# Patient Record
Sex: MALE | Race: WHITE | NOT HISPANIC OR LATINO | URBAN - METROPOLITAN AREA
[De-identification: names, ages, dates, MRNs, and addresses within clinical notes are randomized per-mention and may not be internally consistent; named-entity substitution may affect disease eponyms.]

---

## 2017-01-01 ENCOUNTER — OFFICE VISIT - HEALTHEAST (OUTPATIENT)
Dept: GERIATRICS | Facility: CLINIC | Age: 82
End: 2017-01-01

## 2017-01-01 ENCOUNTER — AMBULATORY - HEALTHEAST (OUTPATIENT)
Dept: GERIATRICS | Facility: CLINIC | Age: 82
End: 2017-01-01

## 2017-01-01 ENCOUNTER — AMBULATORY - HEALTHEAST (OUTPATIENT)
Dept: OTHER | Facility: CLINIC | Age: 82
End: 2017-01-01

## 2017-01-01 ENCOUNTER — HOSPITAL ENCOUNTER (OUTPATIENT)
Dept: RADIOLOGY | Facility: CLINIC | Age: 82
Discharge: HOME | End: 2017-11-28
Attending: NEUROLOGICAL SURGERY

## 2017-01-01 ENCOUNTER — COMMUNICATION - HEALTHEAST (OUTPATIENT)
Dept: NEUROSURGERY | Facility: CLINIC | Age: 82
End: 2017-01-01

## 2017-01-01 ENCOUNTER — OFFICE VISIT - HEALTHEAST (OUTPATIENT)
Dept: NEUROSURGERY | Facility: CLINIC | Age: 82
End: 2017-01-01

## 2017-01-01 ENCOUNTER — AMBULATORY - HEALTHEAST (OUTPATIENT)
Dept: ADMINISTRATIVE | Facility: CLINIC | Age: 82
End: 2017-01-01

## 2017-01-01 DIAGNOSIS — F03.918 DEMENTIA WITH BEHAVIORAL DISTURBANCE (H): ICD-10-CM

## 2017-01-01 DIAGNOSIS — G93.40 ENCEPHALOPATHY: ICD-10-CM

## 2017-01-01 DIAGNOSIS — R53.81 PHYSICAL DECONDITIONING: ICD-10-CM

## 2017-01-01 DIAGNOSIS — G47.00 INSOMNIA: ICD-10-CM

## 2017-01-01 DIAGNOSIS — S06.5XAA SUBDURAL HEMATOMA (H): ICD-10-CM

## 2017-01-01 DIAGNOSIS — Z86.79 S/P EVACUATION OF SUBDURAL HEMATOMA: ICD-10-CM

## 2017-01-01 DIAGNOSIS — R26.89 BALANCE DISORDER: ICD-10-CM

## 2017-01-01 DIAGNOSIS — Z98.890 S/P EVACUATION OF SUBDURAL HEMATOMA: ICD-10-CM

## 2017-01-01 DIAGNOSIS — F03.91 DEMENTIA WITH BEHAVIORAL DISTURBANCE, UNSPECIFIED DEMENTIA TYPE: ICD-10-CM

## 2017-01-01 DIAGNOSIS — S12.600A C7 CERVICAL FRACTURE (H): ICD-10-CM

## 2017-01-01 DIAGNOSIS — Z86.711 HISTORY OF PULMONARY EMBOLISM: ICD-10-CM

## 2017-01-01 DIAGNOSIS — S12.601D CLOSED NONDISPLACED FRACTURE OF SEVENTH CERVICAL VERTEBRA WITH ROUTINE HEALING, UNSPECIFIED FRACTURE MORPHOLOGY, SUBSEQUENT ENCOUNTER: ICD-10-CM

## 2017-01-01 DIAGNOSIS — L89.523: ICD-10-CM

## 2017-01-01 DIAGNOSIS — Z72.820 SLEEP DEPRIVATION: ICD-10-CM

## 2017-01-01 DIAGNOSIS — S06.5XAA SDH (SUBDURAL HEMATOMA) (H): ICD-10-CM

## 2017-01-01 DIAGNOSIS — R41.4 VISUAL NEGLECT: ICD-10-CM

## 2017-01-01 DIAGNOSIS — E78.5 HYPERLIPIDEMIA: ICD-10-CM

## 2017-01-01 DIAGNOSIS — R45.1 RESTLESS: ICD-10-CM

## 2017-01-01 DIAGNOSIS — R13.10 DYSPHAGIA: ICD-10-CM

## 2017-01-01 DIAGNOSIS — R45.851 SUICIDAL THOUGHTS: ICD-10-CM

## 2017-01-01 RX ORDER — POLYETHYLENE GLYCOL 3350 17 G/17G
17 POWDER, FOR SOLUTION ORAL DAILY PRN
Status: SHIPPED | COMMUNITY
Start: 2017-01-01

## 2017-01-01 RX ORDER — AMOXICILLIN 250 MG
2 CAPSULE ORAL 2 TIMES DAILY
Status: SHIPPED | COMMUNITY
Start: 2017-01-01

## 2017-01-01 RX ORDER — ATORVASTATIN CALCIUM 10 MG/1
10 TABLET, FILM COATED ORAL DAILY
Status: SHIPPED | COMMUNITY
Start: 2017-01-01

## 2017-01-01 RX ORDER — BISACODYL 10 MG
10 SUPPOSITORY, RECTAL RECTAL DAILY PRN
Status: SHIPPED | COMMUNITY
Start: 2017-01-01

## 2017-01-01 RX ORDER — ACETAMINOPHEN 325 MG/1
650 TABLET ORAL EVERY 4 HOURS PRN
Status: SHIPPED | COMMUNITY
Start: 2017-01-01

## 2017-12-07 ENCOUNTER — COMMUNICATION - HEALTHEAST (OUTPATIENT)
Dept: NEUROSURGERY | Facility: CLINIC | Age: 82
End: 2017-12-07

## 2021-05-31 VITALS — WEIGHT: 176 LBS | BODY MASS INDEX: 22 KG/M2

## 2021-06-12 NOTE — PROGRESS NOTES
"[facility]    Facility:   [facility] [[id number]]   Code Status: DNR      CHIEF COMPLAINT/REASON FOR VISIT:  Chief Complaint   Patient presents with     Review Of Multiple Medical Conditions       HISTORY:      HPI: Dale is a 85 y.o. male in follow-up of his multiple issues.  He has been getting sleep at night and this has made his behaviors better during the day.  However still during the day he is always wanting to get up and out of either the chair or the bed.  Staff has been taking turns being one-on-one with him.  He is now also gotten stronger.  He is a little irritable today.  When I asked him if his son had seen him lately he said \"of course she is my son what a stupid question\" he was not very well coming in my visit.  And asked me to walk away because I am apparently blocking his review of the doorway.      Past Medical History:   Diagnosis Date     DVT (deep venous thrombosis)      HLD (hyperlipidemia)      Left-sided weakness      Pulmonary embolism without acute cor pulmonale      SDH (subdural hematoma)              No family history on file.  Social History     Social History     Marital status:      Spouse name: N/A     Number of children: N/A     Years of education: N/A     Social History Main Topics     Smoking status: Not on file     Smokeless tobacco: Not on file     Alcohol use Not on file     Drug use: Not on file     Sexual activity: Not on file     Other Topics Concern     Not on file     Social History Narrative     No narrative on file         Review of Systems  Unable to obtain because of level of mentation.  Patient is alert, awake, oriented to, place and person, not in acute cardiorespiratory distress  Skin: Warm, and moist,  no lesions,   Head: atraumatic, normocephalic,   Eyes: EOM's intact and conjugate, pink palpebral conjunctivae, anicteric sclerae, pupils reactive  Lungs : Clear to auscultation , no crackles, wheezes or " rhonchi  Heart : Nornal first and second heart sounds, No murmurs, heaves, or thrills  Abdomen: Soft, non tender, non distended, no hepatosplenomegaly, no ascites  Extremities: No edema, pulses are full and equal  Ulcer in the back overall looks better but still present still not closing up.    .There were no vitals filed for this visit.    Physical Exam      LABS:   No results found for this or any previous visit (from the past 168 hour(s)).      ASSESSMENT:      ICD-10-CM    1. Restless R45.1    2. Encephalopathy G93.40    3. SDH (subdural hematoma) I62.00    4. History of pulmonary embolism Z86.711        PLAN:    At this point would add Seroquel 12.5 mg p.o. once a day as needed and only when he poses a risk of harm to himself or others.  Continue with Seroquel at night as this has definitely helped with his sleep.  CT scan is scheduled in follow-up with neurology.  Lovenox has been discontinued since he has been more active.      Total 25 minutes of which 55% was spent counseling and coordination of care of the above plan.    Electronically signed by: Marc Jimenez MD

## 2021-06-12 NOTE — PROGRESS NOTES
[facility]    Facility:   [facility] [[id number]]   Code Status: POLST AVAILABLE      CHIEF COMPLAINT/REASON FOR VISIT:  Chief Complaint   Patient presents with     Review Of Multiple Medical Conditions       HISTORY:      HPI: Dale is a 85 y.o. male seen today in follow-up of his multiple issues.  He was seen sitting in the lobby area.  Talking with the .  About the same as far as his ability to communicate effectively.  He answers questions but it does not always mean that he is making any sense.  His sleep is still very poor.  Sleeps minimally.  And always trying to get out of bed when in bed.  Has not had any falls over the weekend.  He continues to work with physical therapy.  Left-sided weakness and seems to lean over on the right side.      Past Medical History:   Diagnosis Date     DVT (deep venous thrombosis)      HLD (hyperlipidemia)      Left-sided weakness      Pulmonary embolism without acute cor pulmonale      SDH (subdural hematoma)              No family history on file.  Social History     Social History     Marital status:      Spouse name: N/A     Number of children: N/A     Years of education: N/A     Social History Main Topics     Smoking status: Not on file     Smokeless tobacco: Not on file     Alcohol use Not on file     Drug use: Not on file     Sexual activity: Not on file     Other Topics Concern     Not on file     Social History Narrative     No narrative on file         Review of Systems  Unreliable because of patient's level of dementia  .There were no vitals filed for this visit.    Physical Exam  Vital signs are noted.  Patient is alert, awake,, not in acute cardiorespiratory distress  Skin: Warm, and moist,  no lesions,   Head: Scalp laceration is healed completely, normocephalic,   Eyes: EOM's intact and conjugate, pink palpebral conjunctivae, anicteric sclerae, pupils reactive  Lungs : Clear to auscultation , no  crackles, wheezes or rhonchi  Heart : Nornal first and second heart sounds, No murmurs, heaves, or thrills  Abdomen: Soft, non tender, non distended, no hepatosplenomegaly, no ascites  Extremities: No edema, pulses are full and equal      LABS:   No results found for this or any previous visit (from the past 72 hour(s)).      ASSESSMENT:      ICD-10-CM    1. SDH (subdural hematoma) I62.00    2. Encephalopathy G93.40    3. History of pulmonary embolism Z86.711    4. Restless R45.1        PLAN:    Add Seroquel 25 mg p.o. q. at bedtime  Continue to redirect into oriented 19 day.  Continue with current therapies.  Follow-up with neurology proceeded by a CT scan.  Discontinue Lovenox as he is mobile.  Do not restart warfarin.  Total 25 minutes of which 55% was spent counseling and coordination of care of the above plan.    Electronically signed by: Marc Jimenez MD

## 2021-06-12 NOTE — PROGRESS NOTES
[facility]    Facility:   [facility] [[id number]]   Code Status: DNR      CHIEF COMPLAINT/REASON FOR VISIT:  Chief Complaint   Patient presents with     Review Of Multiple Medical Conditions       HISTORY:      HPI: Dale is a 85 y.o. male was seen today in follow-up of his multiple issues.  I saw him today reading the newspaper.  He was actually reading the newspaper intently and this is such an improvement in him.  Although he is still quite restless and continuously attempts to get out of bed when he is taken to bed.  Staff has been weaning him out in the reception area during the day  He is now able to tell me the events that led to his hospitalization.  This is again an improvement.  His scalp wound is getting better.  His back wound is getting better as well.  He is still on Lovenox for his history of pulmonary embolism which he had acquired in 2013.  Recommendation from neurosurgery is to not put him back on Coumadin long-term but instead discussed with primary care physician as to the best next step in the management of his history of PE.  He slept from 2 AM to 6 PM last night.  He gets up using the easy stand.  He walked about 15 steps with physical therapy today.      Past Medical History:   Diagnosis Date     DVT (deep venous thrombosis)      HLD (hyperlipidemia)      Left-sided weakness      Pulmonary embolism without acute cor pulmonale      SDH (subdural hematoma)              No family history on file.  Social History     Social History     Marital status:      Spouse name: N/A     Number of children: N/A     Years of education: N/A     Social History Main Topics     Smoking status: Not on file     Smokeless tobacco: Not on file     Alcohol use Not on file     Drug use: Not on file     Sexual activity: Not on file     Other Topics Concern     Not on file     Social History Narrative     No narrative on file         Review of Systems  Unremarkable  .There  were no vitals filed for this visit.    Physical Exam  Vital signs are stable  Patient is alert, awake, oriented to  place and person, not in acute cardiorespiratory distress  Skin: Warm, and moist, staples are taken out.  Head: atraumatic, normocephalic, back ulcer looks a lot better.  Slough is all gone.  Ulcer is shallower  Eyes: EOM's intact and conjugate, pink palpebral conjunctivae, anicteric sclerae, pupils reactive  Lungs : Clear to auscultation , no crackles, wheezes or rhonchi  Heart : Nornal first and second heart sounds, No murmurs, heaves, or thrills  Abdomen: Soft, non tender, non distended, no hepatosplenomegaly, no ascites  Extremities: No edema, pulses are full and equal      LABS:   No results found for this or any previous visit (from the past 72 hour(s)).      ASSESSMENT:      ICD-10-CM    1. Sleep deprivation Z72.820    2. Pressure ulcer of ankle, left, stage III L89.523    3. S/P evacuation of subdural hematoma Z98.890     Z86.79    4. Dementia with behavioral disturbance F03.91        PLAN:    Increase melatonin to 10 mg to allow for more restful sleep.  This will provide him with a clear mind in the morning.  Continue with wound cares on the back.    Discontinue Lovenox at discharge but he needs to see his primary care physician to discuss further plans regarding his pulmonary embolism.  A CT scan should be done in a month to see the progress of his subdural hematoma.  She continues with physical therapy at this time      Total 25 minutes of which 55% was spent counseling and coordination of care of the above plan.    Electronically signed by: Marc Jimenez MD

## 2021-06-12 NOTE — PROGRESS NOTES
[facility]    Facility:   [facility] [[id number]]   Code Status: DNR/DNI      CHIEF COMPLAINT/REASON FOR VISIT:  Chief Complaint   Patient presents with     Follow Up     SDH       HISTORY:      HPI: Dale is a 85 y.o. male who I had a chance to visit with today secondary to his hospitalization July 20 - July 28, 2017 secondary to a subdural hematoma right cerebral hemisphere with left-sided weakness also status post evacuation.  Had a chance to talk with he and therapy today to try to see where things are going in he does have a balance issue he does have a left neglect along with the visual field cut.  Generalized weakness he does have a good sense of humor.  Not having any discomfort.  Getting extra nutrient supplements.  For sleep recently the melatonin was increased to 10 mg at a chance to address that with he and therapy today.  Having periods of sleepiness during the day.    Past Medical History:   Diagnosis Date     DVT (deep venous thrombosis)      HLD (hyperlipidemia)      Left-sided weakness      Pulmonary embolism without acute cor pulmonale      SDH (subdural hematoma)              No family history on file.  Social History     Social History     Marital status:      Spouse name: N/A     Number of children: N/A     Years of education: N/A     Social History Main Topics     Smoking status: Not on file     Smokeless tobacco: Not on file     Alcohol use Not on file     Drug use: Not on file     Sexual activity: Not on file     Other Topics Concern     Not on file     Social History Narrative     No narrative on file         Review of Systems  Currently denies chills fever cough wheeze chest pain dizziness or vertigo nausea vomiting diarrhea dysuria unusual myalgias or arthralgias.  A history of subdural hematoma  Current Outpatient Prescriptions   Medication Sig     acetaminophen (TYLENOL) 325 MG tablet Take 650 mg by mouth every 4 (four) hours as needed for  pain.     atorvastatin (LIPITOR) 10 MG tablet Take 10 mg by mouth daily.     bisacodyl (DULCOLAX) 10 mg suppository Insert 10 mg into the rectum daily as needed.     donepezil (ARICEPT) 5 MG tablet Take 5 mg by mouth at bedtime.     enoxaparin (LOVENOX) 30 mg/0.3 mL syringe Inject 30 mg under the skin every 12 (twelve) hours.     levETIRAcetam (KEPPRA) 100 mg/mL solution Take 500 mg by mouth 2 (two) times a day.     melatonin 3 mg Tab tablet Take 3 mg by mouth at bedtime.     pantoprazole (PROTONIX) 40 MG tablet Take 40 mg by mouth daily.     polyethylene glycol (MIRALAX) 17 gram packet Take 17 g by mouth daily as needed.     potassium chloride SA (K-DUR,KLOR-CON) 20 MEQ tablet Take 20 mEq by mouth daily.     senna-docusate (SENNOSIDES-DOCUSATE SODIUM) 8.6-50 mg tablet Take 2 tablets by mouth 2 (two) times a day.       .There were no vitals filed for this visit.  Blood pressure 119/62 pulse 63 respirations 16  Physical Exam   Constitutional: No distress.   HENT:   Head: Normocephalic.   Eyes: Pupils are equal, round, and reactive to light.   Neck: Neck supple. No thyromegaly present.   Cardiovascular:   Irregularity.  No edema.   Pulmonary/Chest: Breath sounds normal.   Abdominal: Bowel sounds are normal. There is no tenderness. There is no guarding.   Musculoskeletal:   Left side neglect.  Visual field cut.  Leaning to the left.  Balance concerns.  Wheelchair.   Lymphadenopathy:     He has no cervical adenopathy.   Neurological: He is alert.   Skin: Skin is warm and dry. No rash noted.   Right sided scalp healing scar from his evacuation         LABS:   No results found for: WBC, HGB, HCT, MCV, PLT  No results found for this or any previous visit.          ASSESSMENT:      ICD-10-CM    1. SDH (subdural hematoma) I62.00    2. Balance disorder R26.89    3. Visual neglect R41.4    4. Insomnia G47.00        PLAN:    He is getting house nutrient supplements including extra putting.  The melatonin was recently increased  to 10 mg.  He has been normotensive.  Had a chance to speak with he and therapy today plus still trying to find out if he does have a follow-up with neurology or not so right in order to see if and when that has been planned and decided upon.      Electronically signed by: Michael Duane Johnson, CNP

## 2021-06-12 NOTE — PROGRESS NOTES
[facility]    Facility:   [facility] [[id number]]   Code Status: DNR/DNI      CHIEF COMPLAINT/REASON FOR VISIT:  Chief Complaint   Patient presents with     Review Of Multiple Medical Conditions       HISTORY:      HPI: Dale is a 85 y.o. male was seen today in follow-up.  I am seeing him today for face-to-face encounter as well.  Dale will be leaving tomorrow back to his previous assisted living.  He will be transferred to memory care unit.  He has done well since Seroquel has been reintroduced to his medication regimen.  There has not been any suicidal comments.  He has been able to sleep at least through more than half of the night.  During the day he still wants to be outside but fidgets much less.  He has been having good response from low-dose Seroquel 12 point 5 at night and 12.5 mg in the morning.    He is most safe in a wheelchair.  Because he tends to be impulsive and still has trouble with balance.    His back of the neck wound is healing.  Although it is not completely healed yet.    Past Medical History:   Diagnosis Date     DVT (deep venous thrombosis)      HLD (hyperlipidemia)      Left-sided weakness      Pulmonary embolism without acute cor pulmonale      SDH (subdural hematoma)              No family history on file.  Social History     Social History     Marital status:      Spouse name: N/A     Number of children: N/A     Years of education: N/A     Social History Main Topics     Smoking status: Not on file     Smokeless tobacco: Not on file     Alcohol use Not on file     Drug use: Not on file     Sexual activity: Not on file     Other Topics Concern     Not on file     Social History Narrative     No narrative on file         Review of Systems  Unremarkable  .There were no vitals filed for this visit.    Physical Exam  Vital signs noted  Patient is alert, awake, oriented toperson, not in acute cardiorespiratory distress  Skin: Warm, and moist,   no lesions, nape wound with small amount of clear discharge no infection  Head: atraumatic, normocephalic,   Eyes: EOM's intact and conjugate, pink palpebral conjunctivae, anicteric sclerae, pupils reactive  Lungs : Clear to auscultation , no crackles, wheezes or rhonchi  Heart : Nornal first and second heart sounds, No murmurs, heaves, or thrills  Abdomen: Soft, non tender, non distended, no hepatosplenomegaly, no ascites  Extremities: No edema, pulses are full and equal        LABS:   No results found for this or any previous visit (from the past 72 hour(s)).      ASSESSMENT:      ICD-10-CM    1. Encephalopathy G93.40    2. Balance disorder R26.89    3. Physical deconditioning R53.81    4. Dementia with behavioral disturbance F03.91        PLAN:    Okay to go back to his memory care assisted living.  He will be needing a standard wheelchair with standard seat and 2 standardleg rest and cushion and arm rest.  He has mobility limitations significantly impairing his ability to participate in his activities of daily living.  He has minimal assist and maximal set up and cues for upper body dressing and hygiene.  He needs moderate assist with cues for lower body dressing.  It is very hard to keep him on a specific task.  MMSE score is low at 14/28 and slums score is 5 out of 30.  Has some component of left-sided neglect  He requires contact-guard assist for safety of transfers and ambulation with handhold assist.  He remains a high falls risk so he will be unable to be independent at this time.  His Pierre score is 31/56.  He can independently wheel himself in a wheelchair using his feet and hands although he needs assist for remembering to lock the brakes for transfers.  He can be independently move about while in a wheelchair but not safe to transfer or ambulate by himself because of high falls risk.    Total 25 minutes of which 55% was spent counseling and coordination of care of the above plan.    Electronically  signed by: Marc Jimenez MD

## 2021-06-12 NOTE — PROGRESS NOTES
[facility]    Facility:   [facility] [[id number]]   Code Status: DNR/DNI  PCP: Jarrell Kramer MD   Phone: [phone]   Fax: [phone]      CHIEF COMPLAINT/REASON FOR VISIT:  Chief Complaint   Patient presents with     Discharge Summary       HISTORY COURSE:  Dale is a 85 y.o. male who was admitted to [facility] on July 20 and transferred to this facility in July 28, 2017.  He normally resides in an assisted living facility.  Maintain some form of independence such as dressing up in making his own breakfast and lunch he has housekeeping that comes once a week and goes with his some grocery shopping for essential items.  He is  and has 1 child by the name of Chance he is a retired .. And a recovering alcoholic.  He had quit smoking many years ago.     He was admitted to the hospital on the basis of left-sided weakness and new intention tremor.  This happened in the background of a couple of falls that happened within the month of his admission.  Most recent fall was 10 days prior to admission, where he had admitted that he hit his head.     He was discovered to have subdural hematoma and a large one on the right side with mass-effect on the right cerebral hemisphere with a 10 mm shift.     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  He was admitted straight to neuro ICU and started on Keppra.  He was taken to the OR on 7/21/2017 and underwent evacuation of hematoma resulting partial evacuation on subsequent scans and improved appearance of hematoma now showing only 5 mm shift.     He had decreased level of consciousness postoperative and had significant delirium.  He also had significant dysphagia and was seen by speech and at the time of discharge was recommended and NDD 1 diet with thin liquids.  Due to the extent of his bleed,     Neurosurgery recommended that he not resume  his warfarin but due to his immobility he was placed on Lovenox twice daily.   During his stay in the transitional care unit he was very confused and agitated staff had been needing to give one-to-one monitoring.. He remained confused throughout his stay here.  A small dose of Seroquel 25 mg at night did improve this to where he started sleeping at night this improved his participation with physical therapy and in fact was able to get to a point where he was walking independently however because of his confusion and still persistence of some balance issues, he remained a contact guard assist.    He did make a couple of suicidal comments (but no plans or intention.  )So Seroquel was discontinued and trazodone given in its place, however this was not effective and he went back to being very agitated up all night and up during the day all day.  And thus posing a risk to himself and others as well.  Trial of reinitiation of Seroquel with very close monitoring for recurrence of suicidal comments.  He did not have any recurrence of suicidal thoughts.  He again started having good sleep at night half the dose of Seroquel was added during the daytime as he continued to up and out of the chair during the day pacing the entire time    Lovenox was discontinued as he has been ambulatory he developed a pressure wound  On the bony prominence of his nape this was treated with bacitracin and dressing and this improved  He will have a close follow-up with neurology with a CT scan before the visit.  He was calm and pleasant during my visit today.  He is more aware of where he is stating [facility] is the name of this place.  He was also able to recall the fall and the surgery that he had to undergo because of the bleed inside his brain.  He is being transferred back to his previous facility but at the memory care department.      Review of Systems  Unreliable because of patient's cognitive status  There were no vitals  filed for this visit.    Physical Exam  Vital signs noted and stable  Patient is alert, awake,  not in acute cardiorespiratory distress  Skin: Warm, and moist, nape wound lesion is healing,   Head: atraumatic, normocephalic,   Eyes: EOM's intact and conjugate, pink palpebral conjunctivae, anicteric sclerae, pupils reactive  Lungs : Clear to auscultation , no crackles, wheezes or rhonchi  Heart : Nornal first and second heart sounds, No murmurs, heaves, or thrills  Abdomen: Soft, non tender, non distended, no hepatosplenomegaly, no ascites  Extremities: No edema, pulses are full and equal      MEDICATION LIST:  Current Outpatient Prescriptions   Medication Sig     QUEtiapine (SEROQUEL) 25 MG tablet Take 12.5 mg by mouth at bedtime. And 12.5 mg once daily PRN for agitation behaviors that poses risk of harm to self or others     acetaminophen (TYLENOL) 325 MG tablet Take 650 mg by mouth every 4 (four) hours as needed for pain.     atorvastatin (LIPITOR) 10 MG tablet Take 10 mg by mouth daily.     bisacodyl (DULCOLAX) 10 mg suppository Insert 10 mg into the rectum daily as needed.     donepezil (ARICEPT) 5 MG tablet Take 5 mg by mouth at bedtime.     levETIRAcetam (KEPPRA) 100 mg/mL solution Take 500 mg by mouth 2 (two) times a day.     melatonin 3 mg Tab tablet Take 3 mg by mouth at bedtime.     pantoprazole (PROTONIX) 40 MG tablet Take 40 mg by mouth daily.     polyethylene glycol (MIRALAX) 17 gram packet Take 17 g by mouth daily as needed.     potassium chloride SA (K-DUR,KLOR-CON) 20 MEQ tablet Take 20 mEq by mouth daily.     senna-docusate (SENNOSIDES-DOCUSATE SODIUM) 8.6-50 mg tablet Take 2 tablets by mouth 2 (two) times a day.       DISCHARGE DIAGNOSIS:    ICD-10-CM    1. Encephalopathy G93.40    2. S/P evacuation of subdural hematoma Z98.890     Z86.79    3. SDH (subdural hematoma) I62.00    4. History of pulmonary embolism Z86.711    5. Restless R45.1    6. Pressure ulcer of ankle, left, stage III L89.523         MEDICAL EQUIPMENT NEEDS:  none    DISCHARGE PLAN/FACE TO FACE:  I certify that services are/were furnished while this patient was under the care of a physician and that a physician or an allowed non-physician practitioner (NPP), had a face-to-face encounter that meets the physician face-to-face encounter requirements. The encounter was in whole, or in part, related to the primary reason for home health. The patient is confined to his/her home and needs intermittent skilled nursing, physical therapy, speech-language pathology, or the continued need for occupational therapy. A plan of care has been established by a physician and is periodically reviewed by a physician.  Date of Face-to-Face Encounter: 8/30/17    I certify that, based on my findings, the following services are medically necessary home health services: resume services at the memory unit      The patient is, or has been, under my care and I have initiated the establishment of the plan of care. This patient will be followed by a physician who will periodically review the plan of care..Total discharge time was more than 35 minutes.      This note has been dictated using voice recognition software. Any grammatical, typographical, or context distortions are unintentional.        Electronically signed by: Marc Jimenez MD

## 2021-06-12 NOTE — PROGRESS NOTES
[facility]      Facility:    [facility] [[id number]]  Code Status: DNR/DNI      Chief Complaint/Reason for Visit:  Chief Complaint   Patient presents with     H & P       HPI:   Dale is a 85 y.o. male who was admitted to [facility] on July 20 and transferred to this facility in July 28, 2017.  He normally resides in an assisted living facility.  Maintain some form of independence such as dressing up in making his own breakfast and lunch he has housekeeping that comes once a week and goes with his some grocery shopping for essential items.  He is  and has 1 child by the name of Chance he is a retired .. And a recovering alcoholic.  He had quit smoking many years ago.    He was admitted to the hospital on the basis of left-sided weakness and new intention tremor.  This happened in the background of a couple of falls that happened within the month of his admission.  Most recent fall was 10 days prior to admission, where he had admitted that he hit his head.    He was discovered to have subdural hematoma and a large one on the right side with mass-effect on the right cerebral hemisphere with a 10 mm shift.    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  He was admitted straight to neuro ICU and started on Keppra.  He was taken to the OR on 7/21/2017 and underwent evacuation of hematoma resulting partial evacuation on subsequent scans and improved appearance of hematoma now showing only 5 mm shift.    He had decreased level of consciousness postoperative and had significant delirium.  He also had significant dysphagia and was seen by speech and at the time of discharge was recommended and NDD 1 diet with thin liquids.  Due to the extent of his bleed,    Neurosurgery recommended that he not resume his warfarin but due to his immobility he was placed on Lovenox twice daily.    He was very  confused and agitated during my visit.  Staff mentions that over the entire weekend they are having to watch him 1-1.  He constantly gets out of his bed and out of his chair.  He is able to asked to go to the bathroom but sometimes he would urinate  Even before he is taken there.  He is able to express his pain he is able to express his needs.  During my visit, he was very confused still.  He was able to answer simple yes or no questions.  He denies any headaches he does not know where he has he could not tell me what happened to him he can tell me his name nor his son's name.  He told me a different name he told me different name of his son.  He could not tell me his birthday.    He remained pleasant during my visit although rather quite confused.  Nursing aides noted that he ate quite good this morning almost finishing his breakfast.  He did not show signs of aspiration.    Other review systems could not be obtained because of his level of confusion.    Past Medical History:  No past medical history on file.        Surgical History:  No past surgical history on file.    Family History:   No family history on file.    Social History:    Social History     Social History     Marital status:      Spouse name: N/A     Number of children: N/A     Years of education: N/A     Social History Main Topics     Smoking status: Not on file     Smokeless tobacco: Not on file     Alcohol use Not on file     Drug use: Not on file     Sexual activity: Not on file     Other Topics Concern     Not on file     Social History Narrative          Review of Systems  Denies any pain denies any discomfort the rest of the ROS is not reliable  There were no vitals filed for this visit.    Physical Exam  Vital signs noted blood sugars are on the higher side.  Patient is alert, awake, very much confused.  I curvilinear incision scar on the right n acute cardiorespiratory distress, unable to follow my commands except to raise arm  up.  Skin: Warm, and moist,  no lesions,   Head: atraumatic, normocephalic, frontoparietal area with staples.  Eyes: EOM's intact and conjugate, pink palpebral conjunctivae, anicteric sclerae, pupils reactive  Lungs : Clear to auscultation , no crackles, wheezes or rhonchi  Heart : Irregularly irregular rhythm Nornal first and second heart sounds, No murmurs, heaves, or thrills  Abdomen: Soft, non tender, non distended, no hepatosplenomegaly, no ascites  Extremities: DTRs are difficult to elicit because he kept moving about.  No edema, pulses are full and equal      Medication List:  No current outpatient prescriptions on file.       Labs:  No results found for this or any previous visit (from the past 72 hour(s)).      Assessment:    ICD-10-CM    1. Subdural hematoma I62.00    2. S/P evacuation of subdural hematoma Z98.890     Z86.79    3. Encephalopathy G93.40    4. History of pulmonary embolism Z86.711    5. Dysphagia R13.10    6. Hyperlipidemia E78.5    7. Physical deconditioning R53.81    8. Dementia with behavioral disturbance F03.91        Plan:  Staples to be removed today.  Repeat CT scan in 4 weeks.  However if neurologic status continues to worsen after 1-2 days will need to schedule that sooner.  He is confusion might be secondary to a new environment continue to encourage orientation to night and day, bringing patient out where there are people around to increase safety.  Redirection.  Continue Keppra for prophylaxis.  Working with speech therapy.  Brought up safety issues with nurse manager.  Unsure if there would be available staff enough to provide one-to-one care for the next 1-2 days until delirium resolves.  It might be helpful to transfer him to her room just within site from the .  Continue Lovenox but will discontinue once more ambulatory.    Total time spent was 60 minutes with more than 50% spent on counseling, discussion of treatment plan and extensive review of available  records  This note has been dictated using voice recognition software. Any grammatical, typographical, or context distortions are unintentional.          Electronically signed by: Marc Jimenez MD

## 2021-06-12 NOTE — PROGRESS NOTES
[facility]    Facility:   [facility] [[id number]]   Code Status: DNR/DNI      CHIEF COMPLAINT/REASON FOR VISIT:  Chief Complaint   Patient presents with     Review Of Multiple Medical Conditions       HISTORY:      HPI: Dale is a 85 y.o. male was seen today in his room sleeping.  A little while later I went back and he was awake.  This is unusual for Dale.  I would normally see him outside in front of the lobby, talking in fidgeting with objects.  Nursing staff notes that he had been more drowsy since yesterday and today.  He also made a comment to our  that he wants to die.  He does not have any plans or any means however.  He is on Seroquel 12.5 mg at night and recently because of his increased risk for falls because of his behavior of wanting to get out of his chair frequently another 12.5 mg of Seroquel was added during the day.  I wonder if this is having an effect on him.  His appetite has improved.  He has been working with speech therapist and he has been upgraded to mechanical soft diet.  Wound on the back of the neck is getting better.  Past Medical History:   Diagnosis Date     DVT (deep venous thrombosis)      HLD (hyperlipidemia)      Left-sided weakness      Pulmonary embolism without acute cor pulmonale      SDH (subdural hematoma)              No family history on file.  Social History     Social History     Marital status:      Spouse name: N/A     Number of children: N/A     Years of education: N/A     Social History Main Topics     Smoking status: Not on file     Smokeless tobacco: Not on file     Alcohol use Not on file     Drug use: Not on file     Sexual activity: Not on file     Other Topics Concern     Not on file     Social History Narrative     No narrative on file         Review of Systems  Unremarkable  .There were no vitals filed for this visit.    Physical Exam  Vital signs noted.  Patient is alert, awake, oriented to  person, not in acute cardiorespiratory distress  Skin: Warm, and moist, (+) clean baseulcer on back of neck,   Head: atraumatic, normocephalic,   Eyes: EOM's intact and conjugate, pink palpebral conjunctivae, anicteric sclerae, pupils reactive  Lungs : Clear to auscultation , no crackles, wheezes or rhonchi  Heart : Nornal first and second heart sounds, No murmurs, heaves, or thrills  Abdomen: Soft, non tender, non distended, no hepatosplenomegaly, no ascites  Extremities: No edema, pulses are full and equal      LABS:   No results found for this or any previous visit (from the past 168 hour(s)).      ASSESSMENT:      ICD-10-CM    1. Suicidal thoughts R45.851    2. SDH (subdural hematoma) I62.00    3. Restless R45.1    4. S/P evacuation of subdural hematoma Z98.890     Z86.79        PLAN:    At this point will discontinue Seroquel and replace it with trazodone 50 mg at night.  May have another dose if not helpful in a couple of hours.  Continue wound dressing for ulcers.  Monitor carefully for continued suicidal comments.  Anticipate physical therapy being done with him pretty soon.    Total 25 minutes of which 55% was spent counseling and coordination of care of the above plan.    Electronically signed by: Marc Jimenez MD

## 2021-06-12 NOTE — PROGRESS NOTES
[facility]    Facility:   [facility] [[id number]]   Code Status: DNR/DNI      CHIEF COMPLAINT/REASON FOR VISIT:  Chief Complaint   Patient presents with     Review Of Multiple Medical Conditions       HISTORY:      HPI: Dale is a 85 y.o. male seen today in management of his multiple medical issues.  Since I discontinued his Seroquel the other day for a couple of suicidal comments that he made towards 2 different staff members, he has not had a decent sleep.  His agitation has heightened he constantly paces around and has been needing a one-to-one sitter the last 1-1/2 days.  I placed him on trazodone 50 mg at night and another 50 mg as needed however this has not helped.    Physical therapy feels that he has declined in that his left-sided neglect had gotten worse.  He remains very confused.  He had grown irate towards 1 of the staff members.  He had one bout of diarrhea however has not recurred.  Speech therapy has cleared him from mechanical soft.        Past Medical History:   Diagnosis Date     DVT (deep venous thrombosis)      HLD (hyperlipidemia)      Left-sided weakness      Pulmonary embolism without acute cor pulmonale      SDH (subdural hematoma)              No family history on file.  Social History     Social History     Marital status:      Spouse name: N/A     Number of children: N/A     Years of education: N/A     Social History Main Topics     Smoking status: Not on file     Smokeless tobacco: Not on file     Alcohol use Not on file     Drug use: Not on file     Sexual activity: Not on file     Other Topics Concern     Not on file     Social History Narrative     No narrative on file         Review of Systems  Unreliable because of his mental state.  .There were no vitals filed for this visit.    Physical Exam  Vital signs noted.  Patient is alert, awake, wanted to pace constantly.   Remains on one-to-one.   Getting out of his chair constantly.  Not  as easily redirectable as before.  Not in acute cardiorespiratory distress  Skin: Warm, and moist,  no lesions,  the wound in the back of the neck is healing  Head: atraumatic, normocephalic, scalp incision looks well-healed.  He is not able to follow my finger  Eyes: EOM's intact and conjugate, pink palpebral conjunctivae, anicteric sclerae, pupils reactive.  To test his cranial nerves III, IV and VI.  Pupils are equally reactive however.  He had a difficult time following instructions however on further observation, he was moving his eyeballs conjugately in all directions still has some element of left-sided neglect.  However he was using his left arm somewhat.  Like pushing chair although the left upper extremity remains weaker than the right.  Lungs : Clear to auscultation , no crackles, wheezes or rhonchi  Heart : Nornal first and second heart sounds, No murmurs, heaves, or thrills  Abdomen: Soft, non tender, non distended, no hepatosplenomegaly, no ascites  Extremities: No edema, pulses are full and equal    Very confused.  LABS:   No results found for this or any previous visit (from the past 72 hour(s)).      ASSESSMENT:      ICD-10-CM    1. Restless R45.1    2. Encephalopathy G93.40    3. Sleep deprivation Z72.820        PLAN:    I would put him back on Seroquel a small dose at night and another dose during the day as needed.  Continue with 1-1 sitter.  Monitor for any signs of suicidality.  Might better be served at the memory unit.  Discontinue trazodone.  Continue other medications.  Total 25 minutes of which 55% was spent counseling and coordination of care of the above plan.    Electronically signed by: Marc Jimenez MD

## 2021-06-14 NOTE — PROGRESS NOTES
[facility]      Facility:    [facility] [place] [facility] [[id number]]  Code Status: DNR      Chief Complaint/Reason for Visit:  Chief Complaint   Patient presents with     H & P       HPI:   Dale is a 85 y.o. male who who sustained a fall resulting in acute fracture through the anterior superior C7 vertebral body and endplate.  He has a complex medical history including subdural hematoma as of July 2017 and has had approximately 5-6 falls since that time.  He has chronic left-sided weakness after the subdural hematoma.  He also has a history of delirium.  He has a history of DVT and pulmonary embolus and severe dementia.  He also has normocytic anemia and osteopenia.    During the hospital stay he was given Seroquel 12.5 mg nightly and initially required Haldol on an as needed basis and only needed it for the first night.  He was started on Keppra for seizure prevention.  He was placed in a [place] J cervical brace and this needs to be left in place for a minimum of 3 months and needs to be on at all times.    Past Medical History:  Past Medical History:   Diagnosis Date     Dementia      DVT (deep venous thrombosis)      HLD (hyperlipidemia)      Left-sided weakness      Pulmonary embolism without acute cor pulmonale      SDH (subdural hematoma)            Surgical History:  Past Surgical History:   Procedure Laterality Date     SUBDURAL HEMATOMA EVACUATION VIA CRANIOTOMY Right 07/21/2017    right frontoparietal        Family History: Family history is not pertinent to the chief complaint nor problem.  No family history on file.    Social History:   His son is power of   Social History     Social History     Marital status:      Spouse name: N/A     Number of children: N/A     Years of education: N/A     Social History Main Topics     Smoking status: Never Smoker     Smokeless tobacco: Never Used     Alcohol use No     Drug use: No     Sexual activity: Not on file     Other  Topics Concern     Not on file     Social History Narrative          Review of Systems   Reason unable to perform ROS: I discussed with the nursing staff because he is unable to give information in his demented state, and there is not a problem with fevers or chills or shortness of breath or chest pain or abdominal pain or nausea and no urinary symptoms nor swelling of hit.   The last phrase is to be nor swelling of feet    Vitals:    11/28/17 1718   BP: 126/67   Pulse: 78   Resp: 20   Temp: 98.8  F (37.1  C)   SpO2: 96%   Weight: 176 lb (79.8 kg)       Physical Exam   Constitutional:   Agitation but not distressed   HENT:   Mouth/Throat: Oropharynx is clear and moist.   Eyes: EOM are normal. Right eye exhibits no discharge. Left eye exhibits no discharge.   Neck:   The [place] cervical collar is in place   Cardiovascular: Normal rate, regular rhythm and normal heart sounds.    Pulmonary/Chest: Effort normal and breath sounds normal.   Abdominal: Soft. Bowel sounds are normal. There is no tenderness.   Musculoskeletal: He exhibits no edema.   Neurological:   He is profoundly demented and cannot make reasonable conversation.  He is frequently attempting to get out of his wheelchair and is reaching at his collar to try to take it off   Psychiatric:   Agitation as mentioned   Nursing note reviewed.      Medication List:  Current Outpatient Prescriptions   Medication Sig     acetaminophen (TYLENOL) 325 MG tablet Take 650 mg by mouth every 4 (four) hours as needed for pain.     atorvastatin (LIPITOR) 10 MG tablet Take 10 mg by mouth daily.     bacitracin zinc 500 unit/gram Pack Apply 1 packet topically daily.     bisacodyl (DULCOLAX) 10 mg suppository Insert 10 mg into the rectum daily as needed.     calcitonin, salmon, (MIACALCIN) 200 unit/actuation nasal spray 1 spray into each nostril daily for 14 days.     haloperidol (HALDOL) 1 MG tablet Take 1 tablet (1 mg total) by mouth 2 (two) times a day as needed (PRN  agitation).     levETIRAcetam (KEPPRA) 500 MG tablet Take 500 mg by mouth 2 (two) times a day.     loperamide (IMODIUM) 2 mg capsule Take 2 mg by mouth every 4 (four) hours as needed for diarrhea.     melatonin 10 mg Tab Take 10 mg by mouth at bedtime.     menthol-zinc oxide (CALMOSEPTINE) 0.44-20.6 % Oint ointment Apply 1 application topically 2 (two) times a day.     oxyCODONE (ROXICODONE) 5 MG immediate release tablet Take 1 tablet (5 mg total) by mouth every 6 (six) hours as needed.     polyethylene glycol (MIRALAX) 17 gram packet Take 17 g by mouth daily as needed.     psyllium (METAMUCIL) 3.4 gram packet Take 1 packet by mouth daily.     QUEtiapine (SEROQUEL) 25 MG tablet Take 0.5 tab by mouth at 1700     senna-docusate (SENNOSIDES-DOCUSATE SODIUM) 8.6-50 mg tablet Take 2 tablets by mouth 2 (two) times a day.       Labs:  Laboratory testing at the hospital showed sodium of 137, potassium 4.5, glucose 116, BUN 21, creatinine 1.06, WBC 5300, hemoglobin 12.2    Assessment:    ICD-10-CM    1. Closed nondisplaced fracture of seventh cervical vertebra with routine healing, unspecified fracture morphology, subsequent encounter S12.601D    2. Dementia with behavioral disturbance, unspecified dementia type F03.91    3. S/P evacuation of subdural hematoma Z98.890     Z86.79    4. Balance disorder R26.89    5. Physical deconditioning R53.81        Plan:  I telephoned his son who is the power of  and discussed my concern about his agitation and attempts at trying to remove the cervical collar and trying to get out of the wheelchair.  He is in agreement that the most important element at this point is to keep him from risking further injury to his neck which could result in quadriplegia, and for this reason an increase in his Seroquel to 25 mg twice daily has been started.  Hopefully his behavior can be managed without as needed Haldol but the as needed dosing schedule will be available for the coming week and an  update that time to decide whether or not it needs to be continued.      Electronically signed by: Calixto Bonilla MD

## 2021-06-14 NOTE — PROGRESS NOTES
[facility]    Facility:   [facility] [place] [facility] [[id number]]   Code Status: DNR      CHIEF COMPLAINT/REASON FOR VISIT:  Chief Complaint   Patient presents with     Review Of Multiple Medical Conditions       HISTORY:      HPI: Dale is a 85 y.o. male who suffered a fracture of C7 vertebral body and is required to wear in [place] J brace for 3 months at all times.  He does have dementia with agitation and upon discussion with the nursing staff he still is trying to get out of the chair on a regular basis but he is not trying to take off the collar as often.  He seems to get more agitated as the oxycodone is wearing out of his system and so the request is that he may require scheduled dosing of oxycodone.    Past Medical History:   Diagnosis Date     Dementia      DVT (deep venous thrombosis)      HLD (hyperlipidemia)      Left-sided weakness      Pulmonary embolism without acute cor pulmonale      SDH (subdural hematoma)              No family history on file.  Social History     Social History     Marital status:      Spouse name: N/A     Number of children: N/A     Years of education: N/A     Social History Main Topics     Smoking status: Never Smoker     Smokeless tobacco: Never Used     Alcohol use No     Drug use: No     Sexual activity: Not on file     Other Topics Concern     Not on file     Social History Narrative         Review of Systems   Reason unable to perform ROS: Due to his dementia he is unable to answer review of systems, but nursing staff states he does not have evidence of other problems at this time.       .  Vitals:    11/25/17 1738   BP: 113/51   Pulse: 66   Resp: 18   Temp: 99.6  F (37.6  C)   SpO2: 91%       Physical Exam   Constitutional: No distress.   HENT:   Mouth/Throat: Oropharynx is clear and moist.   Neurological:   He is attentive to me by looking at me but is not able to answer questions intelligibly   Psychiatric:   He does not appear in  emotional distress   Nursing note and vitals reviewed.        LABS:   No new laboratory tests    ASSESSMENT:      ICD-10-CM    1. Closed nondisplaced fracture of seventh cervical vertebra with routine healing, unspecified fracture morphology, subsequent encounter S12.911N    2. Dementia with behavioral disturbance, unspecified dementia type F03.91        PLAN:    Oxycodone 5 mg as scheduled every 6 hours for 7 days then every 8 hours for 7 days and hopefully at that point he will not need pain medicine of this nature.  Seroquel was increased to 50 mg twice daily on a scheduled basis.  Update requested by phone in 1 week.        Electronically signed by: Calixto Bonilla MD

## 2021-06-14 NOTE — PROGRESS NOTES
CHART NOTE     DATE OF SERVICE:  2017     : 1931   85 y.o.     ASSESSMENT :  No new focal deficits.  Fx appears to be stable.       PLAN: Continue collar and restrictions. RTC 4 weeks with xrays.    HPI:  Dale Gallagher is status post hospital consult on  17 by Dr. Mcmahan for superior C7 vertebral body and endplate fracture, MRI SHOWED small cervical prevertebral edema--suspicious for anterior ligament injury. Patient w/ dementia, severe osteopenia  presented s/p unwitnessed fall at [facility] care.   Not on anticoagulation. Will need [place] J collar for minimum of 3 months. Patient also has h/o crani for evacuation of SDH in 2017 at [facility]. Residual L sided weakness.  Had been doing well after, to TCU and back to assisted living. But transferred to Memory Care at [facility] in [facility] due to worsening dementia and need for increased level of care due to his L sided plegia.       TODAY, Dale Gallagher is here today with is son. Patient reports some occasional neck pain.   Has recently moved to [facility] in [facility] memory care/SNF. No longer ambulating, L sided plegia persists. Care has been recently transferred to [facility] system due to his transfer to [place]. Dr. Bonilla is now his PCP.      PAST MEDICAL HISTORY, SURGICAL HISTORY, REVIEW OF SYMPTOMS, MEDICATIONS AND ALLERGIES:  Past medical history, surgical history, ROS, medications and allergies reviewed with patient and remain unchanged from previous visit.    Past Medical History:   Diagnosis Date     Dementia      DVT (deep venous thrombosis)      HLD (hyperlipidemia)      Left-sided weakness      Pulmonary embolism without acute cor pulmonale      SDH (subdural hematoma)        PHYSICAL EXAM:    /74  Pulse 68  Resp 18      Neurological exam reveals:  Respirations easy, non-labored.   Skin: W/D/I. No rashes, lesions or breaks in integrity.   Recent and remote memory NOT intact, fund of knowledge  DEFICIT  Alert, mildly agitated, speech garbled, can answer some questions and follow commands  PERRL, EOMI, No nystagmus,   Face symmetric, tongue midline, Uvula midline,  palate rises with phonation   Shoulder shrug equal  Arm strength grasp, biceps, triceps, and deltoids 5/5 RUE  Leg strength  dorsiflexion, plantar flexion, and hip flexion 5/5 RLE  Left sided plegia all groups, LUE/LLE  No extremity edema noted.    Muscle Bulk and tone wnl.   Reflexes: No pathological reflexes   Gait and station:In WC, non-ambulatory  NDI/BIRDIE: 62%     RADIOGRAPHIC IMAGING: XR:  Alignment appears stable, fx not well visualized.   Films personally reviewed and interpreted.    Reviewed imaging with patient and family.

## 2021-06-14 NOTE — PROGRESS NOTES
S: Patient seen at [facility] to fill an order for a [place] J collar.    O/g: Patient exhibits symptoms of dementia.  Communicating with him was a challenge.  He was wearing a [place] C-collar when I arrived.  Aide was with him all the time to prevent from getting out of bed.  [place] J collar will provide stabilization to the cervical spine and help to facilitate healing.  A: I assessed size and fit the patient with a size Adult tall.  Don and doff were explained to aide and staff along with how to replace the pad set.  P: Patient via staff will contact our office PRN.    SHAYY Mays

## 2021-08-06 NOTE — PROGRESS NOTES
[facility]    Facility:   [facility] [[id number]]   Code Status: DNR/DNI      CHIEF COMPLAINT/REASON FOR VISIT:  Chief Complaint   Patient presents with     Review Of Multiple Medical Conditions       HISTORY:      HPI: Dale is a 85 y.o. male who was seen today in follow-up of his multiple issues.  Suraj has been doing well.  Last week I started him on a low-dose of Seroquel 25 mg because he continued to have restless nights inability to sleep and was just very agitated wanting to get out of bed.  Amazingly he started sleeping well.  He had a long and straight sleep at night.  The following night he did the same.  During the day he is not has restless.  He is more focused.  He is more appropriate in his answers to questions.  He still has fluctuations of confusion and agitation.  But overall he is more redirectable.  He denies any headaches he denies any visual symptoms.    Past Medical History:   Diagnosis Date     DVT (deep venous thrombosis)      HLD (hyperlipidemia)      Left-sided weakness      Pulmonary embolism without acute cor pulmonale      SDH (subdural hematoma)              No family history on file.  Social History     Social History     Marital status:      Spouse name: N/A     Number of children: N/A     Years of education: N/A     Social History Main Topics     Smoking status: Not on file     Smokeless tobacco: Not on file     Alcohol use Not on file     Drug use: Not on file     Sexual activity: Not on file     Other Topics Concern     Not on file     Social History Narrative     No narrative on file         Review of Systems  He says he is weak on the left side which is accurate/consistent with his presenting symptoms of left-sided weakness.    .There were no vitals filed for this visit.    Physical Exam  Vital signs stable.  Blood pressures under good control.  Patient is alert, awake, oriented to time, place and person, not in acute  cardiorespiratory distress  Skin: Warm, and moist,  no lesions,   Head: Craniotomy incision clean dry and intact, normocephalic,   Eyes: EOM's intact and conjugate, pink palpebral conjunctivae, anicteric sclerae, pupils reactive  Lungs : Clear to auscultation , no crackles, wheezes or rhonchi  Heart : [place] first and second heart sounds, No murmurs, heaves, or thrills  Abdomen: Soft, non tender, non distended, no hepatosplenomegaly, no ascites  Extremities: No edema, pulses are full and equal  Left upper extremity weakness.    LABS:   No results found for this or any previous visit (from the past 72 hour(s)).  No results found for this or any previous visit (from the past 168 hour(s)).      ASSESSMENT:      ICD-10-CM    1. SDH (subdural hematoma) I62.00    2. Encephalopathy G93.40    3. Restless R45.1    4. Insomnia G47.00        PLAN:    Overall he is doing well.  He has actually been walking with physical therapy and when son came yesterday he walked him outside and he did very well.  Lovenox has been discontinued.  Continue with Seroquel 25 mg at bedtime.  Continue melatonin to 10 mg.    Total 25 minutes of which 55% was spent counseling and coordination of care of the above plan.    Electronically signed by: Marc Jimenez MD

## 2025-05-28 NOTE — PROGRESS NOTES
Problem: Safety - Adult  Goal: Free from fall injury  Outcome: Progressing     Problem: Ventricular Assisted Device (VAD)  Goal: Involve in VAD awareness, dressing change  Outcome: Progressing     The patient's goals for the shift include Relieve pain and palpitation    The clinical goals for the shift include safety, monitor MAP. CHG.    Over the shift, the patient did not make progress toward the following goals N/A. Barriers to progression include NONE. Recommendations to address these barriers include NONE.     [facility]    Facility:   [facility] [[id number]]   Code Status: DNR/DNI      CHIEF COMPLAINT/REASON FOR VISIT:  Chief Complaint   Patient presents with     Review Of Multiple Medical Conditions       HISTORY:      HPI: Dale is a 85 y.o. male seen today in follow-up of his multiple issues.  He is doing better today.  Although he still could not sleep straight at night.  He has been staying in the  area and has been communicating with .  He is still intermittently confused.  No aggressive behaviors.  No falls.  Participated with physical therapy.  He was able to stand for 8 minutes.  He is oriented to himself and his son.      Past Medical History:   Diagnosis Date     DVT (deep venous thrombosis)      HLD (hyperlipidemia)      Left-sided weakness      Pulmonary embolism without acute cor pulmonale      SDH (subdural hematoma)              No family history on file.  Social History     Social History     Marital status:      Spouse name: N/A     Number of children: N/A     Years of education: N/A     Social History Main Topics     Smoking status: Not on file     Smokeless tobacco: Not on file     Alcohol use Not on file     Drug use: Not on file     Sexual activity: Not on file     Other Topics Concern     Not on file     Social History Narrative     No narrative on file         Review of Systems  Unreliable because of level of dementia  .There were no vitals filed for this visit.    Physical Exam  Vital signs noted and stable blood sugars stable  Patient is alert, awake, oriented to person, not in acute cardiorespiratory distress  Skin: Warm, and moist, stage III lesion that was initially found yesterday by nursing staff.  This is covered with a very thin film of slough likely from pressure and scratching  Head: atraumatic, normocephalic,   Eyes: EOM's intact and conjugate, pink palpebral conjunctivae, anicteric sclerae, pupils reactive  Lungs  : Clear to auscultation , no crackles, wheezes or rhonchi  Heart : [place] first and second heart sounds, No murmurs, heaves, or thrills  Abdomen: Soft, non tender, non distended, no hepatosplenomegaly, no ascites  Extremities: No edema, pulses are full and equal      LABS:   No results found for this or any previous visit (from the past 168 hour(s)).      ASSESSMENT:      ICD-10-CM    1. Subdural hematoma I62.00    2. S/P evacuation of subdural hematoma Z98.890     Z86.79    3. Insomnia G47.00    4. Pressure ulcer of ankle, left, stage III L89.523    5. Dementia with behavioral disturbance F03.91    6. Physical deconditioning R53.81        PLAN:    Increase melatonin to 6 mg as nursing staff states he has not slept the entire night last night  Clean ulcer with sterile saline and skin prep surrounding it and cover with foam dressing  Continue with physical therapy      Total 25 minutes of which 55% was spent counseling and coordination of care of the above plan.    Electronically signed by: Marc Jimenez MD